# Patient Record
Sex: MALE | Race: WHITE
[De-identification: names, ages, dates, MRNs, and addresses within clinical notes are randomized per-mention and may not be internally consistent; named-entity substitution may affect disease eponyms.]

---

## 2019-01-04 ENCOUNTER — HOSPITAL ENCOUNTER (OUTPATIENT)
Dept: HOSPITAL 76 - LAB.N | Age: 54
Discharge: HOME | End: 2019-01-04
Attending: NURSE PRACTITIONER
Payer: MEDICAID

## 2019-01-04 DIAGNOSIS — R53.83: ICD-10-CM

## 2019-01-04 DIAGNOSIS — E55.9: ICD-10-CM

## 2019-01-04 DIAGNOSIS — I10: Primary | ICD-10-CM

## 2019-01-04 LAB
ALBUMIN DIAFP-MCNC: 4 G/DL (ref 3.2–5.5)
ALBUMIN/GLOB SERPL: 1.1 {RATIO} (ref 1–2.2)
ALP SERPL-CCNC: 77 IU/L (ref 42–121)
ALT SERPL W P-5'-P-CCNC: 67 IU/L (ref 10–60)
ANION GAP SERPL CALCULATED.4IONS-SCNC: 10 MMOL/L (ref 6–13)
AST SERPL W P-5'-P-CCNC: 37 IU/L (ref 10–42)
BASOPHILS NFR BLD AUTO: 0.1 10^3/UL (ref 0–0.1)
BASOPHILS NFR BLD AUTO: 1.6 %
BILIRUB BLD-MCNC: 0.6 MG/DL (ref 0.2–1)
BUN SERPL-MCNC: 14 MG/DL (ref 6–20)
CALCIUM UR-MCNC: 8.5 MG/DL (ref 8.5–10.3)
CHLORIDE SERPL-SCNC: 103 MMOL/L (ref 101–111)
CHOLEST SERPL-MCNC: 267 MG/DL
CO2 SERPL-SCNC: 24 MMOL/L (ref 21–32)
CREAT SERPLBLD-SCNC: 0.9 MG/DL (ref 0.6–1.2)
EOSINOPHIL # BLD AUTO: 0.3 10^3/UL (ref 0–0.7)
EOSINOPHIL NFR BLD AUTO: 3.3 %
ERYTHROCYTE [DISTWIDTH] IN BLOOD BY AUTOMATED COUNT: 14 % (ref 12–15)
FOLATE SERPL-MCNC: 11.07 NG/ML
GFRSERPLBLD MDRD-ARVRAT: 88 ML/MIN/{1.73_M2} (ref 89–?)
GLOBULIN SER-MCNC: 3.8 G/DL (ref 2.1–4.2)
GLUCOSE SERPL-MCNC: 208 MG/DL (ref 70–100)
HDLC SERPL-MCNC: 29 MG/DL
HDLC SERPL: 9.2 {RATIO} (ref ?–5)
HGB UR QL STRIP: 16.6 G/DL (ref 14–18)
LDLC SERPL-MCNC: 145 MG/DL
LDLC/HDLC SERPL: 5 {RATIO} (ref ?–3.6)
LYMPHOCYTES # SPEC AUTO: 2.5 10^3/UL (ref 1.5–3.5)
LYMPHOCYTES NFR BLD AUTO: 28 %
MCH RBC QN AUTO: 28.6 PG (ref 27–31)
MCHC RBC AUTO-ENTMCNC: 35 G/DL (ref 32–36)
MCV RBC AUTO: 81.6 FL (ref 80–94)
MONOCYTES # BLD AUTO: 0.7 10^3/UL (ref 0–1)
MONOCYTES NFR BLD AUTO: 8 %
NEUTROPHILS # BLD AUTO: 5.2 10^3/UL (ref 1.5–6.6)
NEUTROPHILS # SNV AUTO: 8.8 X10^3/UL (ref 4.8–10.8)
NEUTROPHILS NFR BLD AUTO: 59.1 %
PDW BLD AUTO: 8 FL (ref 7.4–11.4)
PLATELET # BLD: 290 10^3/UL (ref 130–450)
PROT SPEC-MCNC: 7.8 G/DL (ref 6.7–8.2)
RBC MAR: 5.82 10^6/UL (ref 4.7–6.1)
SODIUM SERPLBLD-SCNC: 137 MMOL/L (ref 135–145)
TSH SERPL-ACNC: 2.54 UIU/ML (ref 0.34–5.6)
VIT B12 SERPL-MCNC: 476 PG/ML (ref 180–914)

## 2019-01-04 PROCEDURE — 80061 LIPID PANEL: CPT

## 2019-01-04 PROCEDURE — 83721 ASSAY OF BLOOD LIPOPROTEIN: CPT

## 2019-01-04 PROCEDURE — 82746 ASSAY OF FOLIC ACID SERUM: CPT

## 2019-01-04 PROCEDURE — 82607 VITAMIN B-12: CPT

## 2019-01-04 PROCEDURE — 36415 COLL VENOUS BLD VENIPUNCTURE: CPT

## 2019-01-04 PROCEDURE — 80050 GENERAL HEALTH PANEL: CPT

## 2019-01-04 PROCEDURE — 82306 VITAMIN D 25 HYDROXY: CPT

## 2019-07-11 ENCOUNTER — HOSPITAL ENCOUNTER (EMERGENCY)
Dept: HOSPITAL 76 - ED | Age: 54
LOS: 1 days | Discharge: HOME | End: 2019-07-12
Payer: MEDICAID

## 2019-07-11 DIAGNOSIS — I10: ICD-10-CM
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
## 2019-07-11 DIAGNOSIS — S80.01XA: ICD-10-CM

## 2019-07-11 DIAGNOSIS — S50.812A: ICD-10-CM

## 2019-07-11 DIAGNOSIS — S80.211A: ICD-10-CM

## 2019-07-11 DIAGNOSIS — I25.10: ICD-10-CM

## 2019-07-11 DIAGNOSIS — S52.031A: Primary | ICD-10-CM
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
## 2019-07-11 DIAGNOSIS — S52.124A: ICD-10-CM

## 2019-07-11 PROCEDURE — 73080 X-RAY EXAM OF ELBOW: CPT

## 2019-07-11 PROCEDURE — 29105 APPLICATION LONG ARM SPLINT: CPT

## 2019-07-11 PROCEDURE — 80048 BASIC METABOLIC PNL TOTAL CA: CPT

## 2019-07-11 PROCEDURE — 99283 EMERGENCY DEPT VISIT LOW MDM: CPT

## 2019-07-11 PROCEDURE — 96374 THER/PROPH/DIAG INJ IV PUSH: CPT

## 2019-07-11 PROCEDURE — 85025 COMPLETE CBC W/AUTO DIFF WBC: CPT

## 2019-07-11 PROCEDURE — 96361 HYDRATE IV INFUSION ADD-ON: CPT

## 2019-07-11 PROCEDURE — 99284 EMERGENCY DEPT VISIT MOD MDM: CPT
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
## 2019-07-12 VITALS — DIASTOLIC BLOOD PRESSURE: 100 MMHG | SYSTOLIC BLOOD PRESSURE: 163 MMHG

## 2019-07-12 NOTE — XRAY REPORT
Reason:  pain and swelling from fall

Procedure Date:  07/11/2019   

Accession Number:  [id number] / [id number]                    

Procedure:  XR  - Elbow 3 View RT CPT Code:  

 

FULL RESULT:

 

 

EXAM:

RIGHT ELBOW RADIOGRAPHY

 

EXAM DATE: 7/11/2019 11:52 PM.

 

CLINICAL HISTORY: Pain and swelling from fall.

 

COMPARISON: None.

 

TECHNIQUE: 3 views.

 

FINDINGS:

Bones: There is an intra-articular fracture involving the olecranon 

process with up to 2.6 cm of displacement of the proximal fracture 

fragment. Additional nondisplaced fracture components are seen extending 

into the ulnar shaft. There is also a nondisplaced intra-articular radial 

head fracture.

 

Joints: No subluxation. Joint effusion present.

 

Soft Tissues: Soft tissue swelling posteriorly.

IMPRESSION:

1. Nondisplaced intra-articular radial head fracture.

2. Intra-articular olecranon process fracture with up to 2.6 cm of 

fracture fragment separation.

 

[facility]

## 2019-07-24 ENCOUNTER — HOSPITAL ENCOUNTER (OUTPATIENT)
Dept: HOSPITAL 76 - EMS | Age: 54
Discharge: ANOTHER HOSPITAL | End: 2019-07-24
Attending: SURGERY
Payer: MEDICAID

## 2019-07-24 DIAGNOSIS — R42: Primary | ICD-10-CM

## 2019-08-01 ENCOUNTER — HOSPITAL ENCOUNTER (OUTPATIENT)
Dept: HOSPITAL 76 - DI | Age: 54
Discharge: HOME | End: 2019-08-01
Attending: ORTHOPAEDIC SURGERY
Payer: MEDICAID

## 2019-08-01 DIAGNOSIS — S52.021A: ICD-10-CM

## 2019-08-01 DIAGNOSIS — S52.121A: Primary | ICD-10-CM

## 2019-08-02 NOTE — CT REPORT
Reason:  DISPLACED FRACTURE OF HEAD OF RIGHT RADIUS, DISPLA

Procedure Date:  08/01/2019   

Accession Number:  [id number] / [id number]                    

Procedure:  CT  - UPPER EXTREMITY WO - RT CPT Code:  

 

FULL RESULT:

 

 

EXAM:

RIGHT ELBOW CT WITHOUT CONTRAST

 

EXAM DATE: 8/1/2019 11:10 AM.

 

CLINICAL HISTORY: Displaced fracture of head of right radius.

 

COMPARISON: ELBOW 3 VIEW RT 07/11/2019 11:35 PM.

 

TECHNIQUE: Thin-section axial images were acquired of the elbow without 

contrast. Post-processing: Coronal and sagittal reformats. Other: None.

 

In accordance with CT protocol optimization, one or more of the following 

dose reduction techniques were utilized for this exam: automated exposure 

control, adjustment of mA and/or KV based on patient size, or use of 

iterative reconstructive technique.

 

FINDINGS:

Bones and articular surfaces: Comminuted intra-articular fracture of the 

radial head. A large segment of the central and posterior articular 

surface of the radial head is displaced posteriorly. 2 main fragments 

posterior to the capitellum appear to represent displaced articular 

surface fragments. The largest measures approximately 10 x 10 x 2.5 mm. 

The smaller fragment approximately 4.5 x 6.0 x 2 mm. Otherwise radial 

head appears normally aligned with the capitellum. Comminuted fracture of 

the proximal ulna. The proximal aspect of the olecranon process is 

fractured and displaced proximally approximately 2.7 cm. Additional 

nondisplaced oblique fracture courses distally from the olecranon 

fracture exiting the anterior cortex of the proximal ulna approximately 

3.3 cm distal to the tip of the coronoid process. Large elbow joint 

effusion.

 

Musculotendinous structures: Visualized musculotendinous structures 

appear grossly intact. No muscle atrophy or fatty replacement.

IMPRESSION:

1. Comminuted intra-articular radial head fracture with posterior 

displacement of large segments of the articular surface.

2. Comminuted fracture of the proximal ulna with proximal displacement of 

the olecranon fragment approximately 2.7 cm.

 

[facility]

## 2019-12-17 ENCOUNTER — HOSPITAL ENCOUNTER (OUTPATIENT)
Dept: HOSPITAL 76 - LAB.N | Age: 54
End: 2019-12-17
Attending: FAMILY MEDICINE
Payer: MEDICAID

## 2019-12-17 DIAGNOSIS — E11.9: Primary | ICD-10-CM

## 2019-12-17 LAB
ANION GAP SERPL CALCULATED.4IONS-SCNC: 11 MMOL/L (ref 6–13)
BUN SERPL-MCNC: 15 MG/DL (ref 6–20)
CALCIUM UR-MCNC: 8.8 MG/DL (ref 8.5–10.3)
CHLORIDE SERPL-SCNC: 99 MMOL/L (ref 101–111)
CO2 SERPL-SCNC: 27 MMOL/L (ref 21–32)
CREAT SERPLBLD-SCNC: 1.1 MG/DL (ref 0.6–1.2)
CREAT UR-SCNC: 331.2 MG/DL
EST. AVERAGE GLUCOSE BLD GHB EST-MCNC: 169 MG/DL (ref 70–100)
GFRSERPLBLD MDRD-ARVRAT: 69 ML/MIN/{1.73_M2} (ref 89–?)
GLUCOSE SERPL-MCNC: 217 MG/DL (ref 70–100)
HB2 TOTAL: 15.8 G/DL
HBA1C BLD-MCNC: 0.92 G/DL
HEMOGLOBIN A1C %: 7.5 % (ref 4.6–6.2)
MICROALBUMIN UR-MCNC: 53.3 MG/DL (ref 0–300)
MICROALBUMIN/CREAT RATIO PNL UR: 160.9 UG/MG (ref ?–30)
SODIUM SERPLBLD-SCNC: 137 MMOL/L (ref 135–145)

## 2019-12-17 PROCEDURE — 82570 ASSAY OF URINE CREATININE: CPT

## 2019-12-17 PROCEDURE — 83036 HEMOGLOBIN GLYCOSYLATED A1C: CPT

## 2019-12-17 PROCEDURE — 82043 UR ALBUMIN QUANTITATIVE: CPT

## 2019-12-17 PROCEDURE — 36415 COLL VENOUS BLD VENIPUNCTURE: CPT

## 2019-12-17 PROCEDURE — 80048 BASIC METABOLIC PNL TOTAL CA: CPT

## 2020-01-13 ENCOUNTER — HOSPITAL ENCOUNTER (OUTPATIENT)
Dept: HOSPITAL 76 - SC | Age: 55
Discharge: HOME | End: 2020-01-13
Attending: INTERNAL MEDICINE
Payer: MEDICAID

## 2020-01-13 VITALS — SYSTOLIC BLOOD PRESSURE: 160 MMHG | DIASTOLIC BLOOD PRESSURE: 100 MMHG

## 2020-01-13 DIAGNOSIS — E66.9: ICD-10-CM

## 2020-01-13 DIAGNOSIS — R53.83: ICD-10-CM

## 2020-01-13 DIAGNOSIS — I25.9: ICD-10-CM

## 2020-01-13 DIAGNOSIS — I10: ICD-10-CM

## 2020-01-13 DIAGNOSIS — R06.83: Primary | ICD-10-CM

## 2020-01-13 PROCEDURE — 99212 OFFICE O/P EST SF 10 MIN: CPT

## 2020-01-13 PROCEDURE — 99203 OFFICE O/P NEW LOW 30 MIN: CPT

## 2020-01-13 NOTE — SLEEP CARE CONSULTATION
Information from patient questionnaire entered by Winnie Massey.





I have reviewed and concur with the information entered by Winnie Massey. This 

document represents the service I personally performed and the decisions made by

me, Neena Rush MD, [facility].





History of Present Illness


Reason for Visit: New patient


Chief Complaint: reports: Fatigue, Other (checking to see if sleep is ok)


Duration of Symptoms: on/off for a year or so


Usual bedtime: 2300


Time it takes to fall asleep: 20-30 minutes


Snores at night: Yes (sometimes)


Observed to quit breathing while asleep: No


Sleeps alone due to snoring: No


Number of times waking at night: 1


Reasons for waking at night: reports: Bathroom


Toss, Turn, or Twitch while sleeping: No


Recalls having dreams: Yes


Usually gets out of bed at: 0700


Feels refreshed in the morning: Yes


Morning headache: No


Sleepy or fatigued during the day: No (once in a while)


Ever fallen asleep while driving: No


Takes day naps: Yes


Dreams during day naps: Yes


Prior sleep studies: No


Additional HPI information: 


I had the pleasure of seeing Mr. Martini today regarding the possibility of him 

having a sleep disorder.  As you know, he is a 54 year old gentleman who 

complains of fatigue for about a year.  The patient tells me that he normally 

goes to bed around 11 pm, and it takes him approximately 20 - 30 minutes to fall

asleep.  He snores occasionally.  He has never been observed to stop breathing 

in his sleep.  However, he sleeps alone.  He can recall waking up on the average

of 1 time during the night.  Most of the time he wakes up because of having to 

use the bathroom.  He has never awakened occasionally because of his own 

snoring, choking, or having to gasp for air.  There is not a lot of tossing and 

turning in his sleep.  No somniloquy (sleep talking) or somnambulism (sleep 

walking).  Generally he can recall having dreams.  In the morning he usually 

gets up out of the bed around 7 a.m. feeling refreshed and rested.  He usually 

does not have a morning headache.  During the day he does not feel sleepy.  His 

score on [place] Sleepiness Scale is 11 out of 24.  He has never fallen asleep 

while driving nor has had any accident due to sleepiness.  He usually takes naps

during the day.  Upon falling asleep during the day he reports having dreams.  

He has never had sleep paralysis, experienced cataplexy or symptoms of restless 

leg syndrome.  He reports having impaired concentration during the day.








Subjective


Initial [place] Sleepiness Scale score: 11





Past Medical History


Past Medical History: reports: Hypertension, Coronary Heart Disease (s/p CABG), 

Other (double bypass sept 2019)





Social History


The patient is not employed. Patient is Single and lives in [place]. 





Have you smoked in the past 12 months: No


Alcohol use: Yes


Alcohol amount and frequency: 1-2 Sat-Sun


Caffeine use: Yes


Caffeine amount and frequency: 1-2 cups/day





Family History


Family history of sleep disordered breathing: No





Allergies and Home Medications


Drug allergies reviewed: Yes


Home medication list reviewed: Yes


Allergy and home medication list: 


Meds: carvedilol, amlodipine, metformin, aspirin 


Allergies: penicillins








Review of Systems


Weight gain over past 5 years: 20


Weight loss over past 5 years: 20


Cardiovascular: reports: high blood pressure, have to sleep sitting up


Respiratory: reports: shortness of breath


Gastrointestinal: reports: heartburn, diarrhea


Urinary: denies: incontinence, frequency, urgency, impotence, other


Neurological: reports: headaches, head trauma, disorientation


Psychiatric: reports: Attention Deficit Hyperactivity, anxiety, depression, mood

disorder


Ear/Nose/Throat: reports: injury to nose, wisdom teeth removed


Endocrine: denies: thyroid disease, history of goiter, sluggishness, too hot or 

cold, excessive thirst, increased appetite, increased urination, unexplained 

weakness, other


Musculoskeletal: reports: joint pain, neck pain, back pain, muscle pain or 

cramping, mobility problems


Immunologic: reports: sneezing, rash, itching





Physical Exam


Vital signs obtained and entered by: Dr. Rush


Blood Pressure: 160/100


Cuff size: regular


Heart Rate: 73


O2 Saturation: 99


Height: 5 ft 7 in


Weight: 196 lb


Body Mass Index: 30.7


BMI Classification: Obesity Class 1


Neck circumference: 16.5


Mood/affect: normal


HEENT: No craniofacial malformation


Nostrils: patent to airflow


Turbinates: normal


Septum: deviated right


Mouth and throat: narrow oropharynx


Soft palate: long


Hard palate: normal


Uvula: normal


Uvula visualization: 50% Mallampati Class II


Tongue: normal in size


Tonsils: small


Chin and jaw: normal size and position


Neck: normal w/o lymphadenopathy or thyromegaly


Heart: regular rate and rhythm


Lungs: clear bilaterally


Abdomen: soft, non-tender


Extremities: no edema or clubbing


Neurologic: intact, no focal deficits





Impression and Plan


IMPRESSION: 


1. Obstructive Sleep Apnea-Hypopnea Syndrome, as suggested by history of loud 

snoring and fatigue.  Narrow oropharynx and obesity are common predisposing 

factors for obstructive sleep apnea-hypopnea syndrome.  The patient also has 

hypertension and ischemic heart disease, both of which can be worsened by 

untreated obstructive sleep apnea-hypopnea.  Pathophysiology of sleep-disordered

breathing was discussed.  I recommend proceeding to polysomnography to confirm 

the diagnosis and to assess severity.  If he has significant sleep disordered 

breathing, a manual CPAP titration study will also be performed to find the 

optimal treatment pressure.  I informed the patient of what the sleep studies 

involve and after some discussion, he agreed to proceed. 





Plan:  


1. Schedule polysomnography + manual CPAP titration study 


2. Avoid long distance driving or when feeling sleepy.


3. Avoid alcohol, sedative and muscle relaxant around bedtime.


4. Attempt to lose weight.


5. Return in 1 to 2 weeks after the study to discuss results and initiate 

therapy.





I spent 100% of this visit face to face with the patient with greater than 50% 

of this was spent time counseling the patient and coordination of care.

## 2020-01-22 ENCOUNTER — HOSPITAL ENCOUNTER (OUTPATIENT)
Dept: HOSPITAL 76 - SC | Age: 55
Discharge: HOME | End: 2020-01-22
Attending: INTERNAL MEDICINE
Payer: MEDICAID

## 2020-01-22 DIAGNOSIS — G47.61: ICD-10-CM

## 2020-01-22 DIAGNOSIS — G47.33: Primary | ICD-10-CM

## 2020-01-22 DIAGNOSIS — E66.9: ICD-10-CM

## 2020-01-22 PROCEDURE — 95810 POLYSOM 6/> YRS 4/> PARAM: CPT

## 2020-02-17 ENCOUNTER — HOSPITAL ENCOUNTER (OUTPATIENT)
Dept: HOSPITAL 76 - SC | Age: 55
Discharge: HOME | End: 2020-02-17
Attending: INTERNAL MEDICINE
Payer: MEDICAID

## 2020-02-17 DIAGNOSIS — G47.33: Primary | ICD-10-CM

## 2020-02-17 DIAGNOSIS — E66.9: ICD-10-CM

## 2020-02-17 PROCEDURE — 99213 OFFICE O/P EST LOW 20 MIN: CPT

## 2020-02-17 PROCEDURE — 99212 OFFICE O/P EST SF 10 MIN: CPT

## 2020-02-17 NOTE — SLEEP CARE CONSULTATION
Information from patient questionnaire entered by Winnie Massey.





I have reviewed and concur with the information entered by Winnie Massey. This 

document represents the service I personally performed and the decisions made by

me, Neena Rush MD, [facility].





History of Present Illness


Initial [place] Sleepiness Scale score: 11


Current [place] Sleepiness Scale score: 9


Additional HPI information: 


HPI:  Mr. Martini returned for follow up of the sleep study he had on 1/22/2020.

 The polysomnography showed that the patient had poor sleep efficiency due to 

two prolonged awakenings after the sleep onset.   The sleep architecture was 

abnormal for sleep fragmentation and reduced amount of time spent in slow wave 

sleep (N3).  Respiratory monitoring showed mild obstructive sleep apnea-hypopnea

(AHI = 6.2) associated with frequent arousals, oxyhemoglobin desaturation and 

mild hypoxia (harpreet oxygen saturation of 87%).   The patient did not sleep 

supine during this study (supine AHI = 0.0; non-supine = 6.22).   Snore was 

light in intensity.  There was moderate periodic leg movement of sleep not 

contributing to the sleep fragmentation.  Cardiac rhythm was normal sinus rhythm

without significant arrhythmia.  No abnormal behavior (parasomnia) observed 

during the night.





The patient was informed of these findings.  I explained to him the 

pathophysiology behind obstructive sleep apnea.  We then spent quite a bit of 

time discussing different treatment options.  For mild obstructive sleep apnea, 

surgery and oral appliance are alternatives to nasal CPAP therapy but in 

moderate or severe cases, nasal CPAP is the most effective and reliable 

treatment.  Weight loss in an obese individual is strongly recommended.  After 

some discussion, he opted to go with the nasal CPAP therapy.  I explained to him

how CPAP machine works and what to expect when using the machine.  He is 

encouraged to use CPAP every night especially in the first 2 to 3 nights in 

order to get used to it.  He should call his CPAP supplier or me to discuss any 

mechanical problem that may occur.  If he snores or feels like he is not getting

enough air from the machine, he should notify me and I will increase the 

pressure.








Allergies and Home Medications


Drug allergies reviewed: Yes


Home medication list reviewed: Yes





Review of Systems


Review of systems same as previous: Yes





Physical Exam


Height: 5 ft 7 in


Weight: 199 lb


Body Mass Index: 31.1


BMI Classification: Obese





Impression and Plan


IMPRESSION: 


1. Obstructive Sleep Apnea-Hypopnea Syndrome, mild, associated with mild 

hypoxemia and sleep fragmentation. Possibly, this is the cause of the patients 

symptoms of unrefreshed sleep, and excessive daytime sleepiness. As mentioned 

above, the patient will be started on an autoCPAP set at 5 - 15 cmH2O.  

Depending on his response and compliance he may be brought back for an overnight

CPAP titration study.





PLAN:     


1.  Prescription made for an autoCPAP, heated humidifier, and related supplies. 


2.  Attempt to lose weight and avoid alcohol consumption near bedtime.


3.  The patient is again cautioned about driving until his sleepiness completely

resolves on the CPAP therapy.





I spent 100% of this visit face to face with the patient with greater than 50% 

of this was spent time counseling the patient and coordination of care.

## 2020-06-23 ENCOUNTER — HOSPITAL ENCOUNTER (OUTPATIENT)
Dept: HOSPITAL 76 - LAB.WCP | Age: 55
Discharge: HOME | End: 2020-06-23
Attending: FAMILY MEDICINE
Payer: MEDICAID

## 2020-06-23 DIAGNOSIS — E78.2: ICD-10-CM

## 2020-06-23 DIAGNOSIS — E11.9: Primary | ICD-10-CM

## 2020-06-23 DIAGNOSIS — I10: ICD-10-CM

## 2020-06-23 LAB
ALBUMIN DIAFP-MCNC: 4.2 G/DL (ref 3.2–5.5)
ALBUMIN/GLOB SERPL: 1.4 {RATIO} (ref 1–2.2)
ALP SERPL-CCNC: 58 IU/L (ref 42–121)
ALT SERPL W P-5'-P-CCNC: 44 IU/L (ref 10–60)
ANION GAP SERPL CALCULATED.4IONS-SCNC: 9 MMOL/L (ref 6–13)
AST SERPL W P-5'-P-CCNC: 27 IU/L (ref 10–42)
BASOPHILS NFR BLD AUTO: 0.1 10^3/UL (ref 0–0.1)
BASOPHILS NFR BLD AUTO: 1.1 %
BILIRUB BLD-MCNC: 0.4 MG/DL (ref 0.2–1)
BUN SERPL-MCNC: 23 MG/DL (ref 6–20)
CALCIUM UR-MCNC: 9 MG/DL (ref 8.5–10.3)
CHLORIDE SERPL-SCNC: 101 MMOL/L (ref 101–111)
CHOLEST SERPL-MCNC: 238 MG/DL
CO2 SERPL-SCNC: 26 MMOL/L (ref 21–32)
CREAT SERPLBLD-SCNC: 1.3 MG/DL (ref 0.6–1.2)
CREAT UR-SCNC: 274.5 MG/DL
EOSINOPHIL # BLD AUTO: 0.3 10^3/UL (ref 0–0.7)
EOSINOPHIL NFR BLD AUTO: 2.4 %
ERYTHROCYTE [DISTWIDTH] IN BLOOD BY AUTOMATED COUNT: 13.8 % (ref 12–15)
EST. AVERAGE GLUCOSE BLD GHB EST-MCNC: 180 MG/DL (ref 70–100)
GLOBULIN SER-MCNC: 3 G/DL (ref 2.1–4.2)
GLUCOSE SERPL-MCNC: 184 MG/DL (ref 70–100)
HB2 TOTAL: 15.3 G/DL
HBA1C BLD-MCNC: 0.96 G/DL
HDLC SERPL-MCNC: 30 MG/DL
HDLC SERPL: 7.9 {RATIO} (ref ?–5)
HEMOGLOBIN A1C %: 7.9 % (ref 4.6–6.2)
HGB UR QL STRIP: 14.7 G/DL (ref 14–18)
LDLC SERPL-MCNC: 127 MG/DL
LDLC/HDLC SERPL: 4.2 {RATIO} (ref ?–3.6)
LYMPHOCYTES # SPEC AUTO: 2.8 10^3/UL (ref 1.5–3.5)
LYMPHOCYTES NFR BLD AUTO: 26.3 %
MCH RBC QN AUTO: 27.6 PG (ref 27–31)
MCHC RBC AUTO-ENTMCNC: 32.5 G/DL (ref 32–36)
MCV RBC AUTO: 84.8 FL (ref 80–94)
MICROALBUMIN UR-MCNC: 28.8 MG/DL (ref 0–300)
MICROALBUMIN/CREAT RATIO PNL UR: 104.9 UG/MG (ref ?–30)
MONOCYTES # BLD AUTO: 0.9 10^3/UL (ref 0–1)
MONOCYTES NFR BLD AUTO: 8.3 %
NEUTROPHILS # BLD AUTO: 6.5 10^3/UL (ref 1.5–6.6)
NEUTROPHILS # SNV AUTO: 10.6 X10^3/UL (ref 4.8–10.8)
NEUTROPHILS NFR BLD AUTO: 61.5 %
PDW BLD AUTO: 9.9 FL (ref 7.4–11.4)
PLATELET # BLD: 302 10^3/UL (ref 130–450)
PROT SPEC-MCNC: 7.2 G/DL (ref 6.7–8.2)
RBC MAR: 5.33 10^6/UL (ref 4.7–6.1)
SODIUM SERPLBLD-SCNC: 136 MMOL/L (ref 135–145)

## 2020-06-23 PROCEDURE — 82570 ASSAY OF URINE CREATININE: CPT

## 2020-06-23 PROCEDURE — 36415 COLL VENOUS BLD VENIPUNCTURE: CPT

## 2020-06-23 PROCEDURE — 83036 HEMOGLOBIN GLYCOSYLATED A1C: CPT

## 2020-06-23 PROCEDURE — 80061 LIPID PANEL: CPT

## 2020-06-23 PROCEDURE — 82043 UR ALBUMIN QUANTITATIVE: CPT

## 2020-06-23 PROCEDURE — 85025 COMPLETE CBC W/AUTO DIFF WBC: CPT

## 2020-06-23 PROCEDURE — 83721 ASSAY OF BLOOD LIPOPROTEIN: CPT

## 2020-06-23 PROCEDURE — 80053 COMPREHEN METABOLIC PANEL: CPT

## 2020-08-07 ENCOUNTER — HOSPITAL ENCOUNTER (OUTPATIENT)
Dept: HOSPITAL 76 - LAB.R | Age: 55
Discharge: HOME | End: 2020-08-07
Attending: FAMILY MEDICINE
Payer: MEDICAID

## 2020-08-07 DIAGNOSIS — R32: Primary | ICD-10-CM

## 2020-08-07 PROCEDURE — 87086 URINE CULTURE/COLONY COUNT: CPT

## 2021-01-15 ENCOUNTER — HOSPITAL ENCOUNTER (OUTPATIENT)
Dept: HOSPITAL 76 - LAB.N | Age: 56
Discharge: HOME | End: 2021-01-15
Attending: INTERNAL MEDICINE
Payer: MEDICAID

## 2021-01-15 DIAGNOSIS — Z80.42: ICD-10-CM

## 2021-01-15 DIAGNOSIS — R32: ICD-10-CM

## 2021-01-15 DIAGNOSIS — Z11.59: ICD-10-CM

## 2021-01-15 DIAGNOSIS — E11.9: Primary | ICD-10-CM

## 2021-01-15 LAB
ALBUMIN DIAFP-MCNC: 4 G/DL (ref 3.2–5.5)
ALBUMIN/GLOB SERPL: 1.1 {RATIO} (ref 1–2.2)
ALP SERPL-CCNC: 69 IU/L (ref 42–121)
ALT SERPL W P-5'-P-CCNC: 108 IU/L (ref 10–60)
ANION GAP SERPL CALCULATED.4IONS-SCNC: 12 MMOL/L (ref 6–13)
AST SERPL W P-5'-P-CCNC: 65 IU/L (ref 10–42)
BASOPHILS NFR BLD AUTO: 0.1 10^3/UL (ref 0–0.1)
BASOPHILS NFR BLD AUTO: 1.4 %
BILIRUB BLD-MCNC: 0.6 MG/DL (ref 0.2–1)
BUN SERPL-MCNC: 17 MG/DL (ref 6–20)
CALCIUM UR-MCNC: 9 MG/DL (ref 8.5–10.3)
CHLORIDE SERPL-SCNC: 99 MMOL/L (ref 101–111)
CHOLEST SERPL-MCNC: 292 MG/DL
CO2 SERPL-SCNC: 24 MMOL/L (ref 21–32)
CREAT SERPLBLD-SCNC: 1.2 MG/DL (ref 0.6–1.2)
CREAT UR-SCNC: 234.6 MG/DL
EOSINOPHIL # BLD AUTO: 0.3 10^3/UL (ref 0–0.7)
EOSINOPHIL NFR BLD AUTO: 3.3 %
ERYTHROCYTE [DISTWIDTH] IN BLOOD BY AUTOMATED COUNT: 13.5 % (ref 12–15)
GLOBULIN SER-MCNC: 3.5 G/DL (ref 2.1–4.2)
GLUCOSE SERPL-MCNC: 332 MG/DL (ref 70–100)
HBA1C MFR BLD HPLC: 9.9 % (ref 4.27–6.07)
HDLC SERPL-MCNC: 29 MG/DL
HDLC SERPL: 10.1 {RATIO} (ref ?–5)
HGB UR QL STRIP: 16.6 G/DL (ref 14–18)
LDLC SERPL-MCNC: 114 MG/DL
LDLC/HDLC SERPL: 3.9 {RATIO} (ref ?–3.6)
LYMPHOCYTES # SPEC AUTO: 2.6 10^3/UL (ref 1.5–3.5)
LYMPHOCYTES NFR BLD AUTO: 30.3 %
MCH RBC QN AUTO: 28.5 PG (ref 27–31)
MCHC RBC AUTO-ENTMCNC: 33.7 G/DL (ref 32–36)
MCV RBC AUTO: 84.4 FL (ref 80–94)
MICROALBUMIN UR-MCNC: 64.3 MG/DL (ref 0–300)
MICROALBUMIN/CREAT RATIO PNL UR: 274.1 UG/MG (ref ?–30)
MONOCYTES # BLD AUTO: 0.7 10^3/UL (ref 0–1)
MONOCYTES NFR BLD AUTO: 8 %
NEUTROPHILS # BLD AUTO: 4.8 10^3/UL (ref 1.5–6.6)
NEUTROPHILS # SNV AUTO: 8.5 X10^3/UL (ref 4.8–10.8)
NEUTROPHILS NFR BLD AUTO: 56.5 %
PDW BLD AUTO: 10.1 FL (ref 7.4–11.4)
PLATELET # BLD: 272 10^3/UL (ref 130–450)
PROT SPEC-MCNC: 7.5 G/DL (ref 6.7–8.2)
RBC MAR: 5.83 10^6/UL (ref 4.7–6.1)
SODIUM SERPLBLD-SCNC: 135 MMOL/L (ref 135–145)

## 2021-01-15 PROCEDURE — 80061 LIPID PANEL: CPT

## 2021-01-15 PROCEDURE — 83036 HEMOGLOBIN GLYCOSYLATED A1C: CPT

## 2021-01-15 PROCEDURE — 80053 COMPREHEN METABOLIC PANEL: CPT

## 2021-01-15 PROCEDURE — 86803 HEPATITIS C AB TEST: CPT

## 2021-01-15 PROCEDURE — 83721 ASSAY OF BLOOD LIPOPROTEIN: CPT

## 2021-01-15 PROCEDURE — 85025 COMPLETE CBC W/AUTO DIFF WBC: CPT

## 2021-01-15 PROCEDURE — 82274 ASSAY TEST FOR BLOOD FECAL: CPT

## 2021-01-15 PROCEDURE — 82043 UR ALBUMIN QUANTITATIVE: CPT

## 2021-01-15 PROCEDURE — 36415 COLL VENOUS BLD VENIPUNCTURE: CPT

## 2021-01-15 PROCEDURE — 84443 ASSAY THYROID STIM HORMONE: CPT

## 2021-01-15 PROCEDURE — 84153 ASSAY OF PSA TOTAL: CPT

## 2021-01-15 PROCEDURE — 87086 URINE CULTURE/COLONY COUNT: CPT

## 2021-01-15 PROCEDURE — 82570 ASSAY OF URINE CREATININE: CPT

## 2021-01-16 LAB
HEPATITIS C ANTIBODY: (no result)
SIGNAL TO CUT-OFF: 0 (ref ?–1)

## 2021-09-16 ENCOUNTER — HOSPITAL ENCOUNTER (OUTPATIENT)
Dept: HOSPITAL 76 - LAB.WCP | Age: 56
Discharge: HOME | End: 2021-09-16
Attending: INTERNAL MEDICINE
Payer: MEDICAID

## 2021-09-16 DIAGNOSIS — E78.2: ICD-10-CM

## 2021-09-16 DIAGNOSIS — E11.9: Primary | ICD-10-CM

## 2021-09-16 LAB
ALBUMIN DIAFP-MCNC: 4.3 G/DL (ref 3.2–5.5)
ALBUMIN/GLOB SERPL: 1.1 {RATIO} (ref 1–2.2)
ALP SERPL-CCNC: 79 IU/L (ref 42–121)
ALT SERPL W P-5'-P-CCNC: 67 IU/L (ref 10–60)
ANION GAP SERPL CALCULATED.4IONS-SCNC: 10 MMOL/L (ref 6–13)
AST SERPL W P-5'-P-CCNC: 38 IU/L (ref 10–42)
BASOPHILS NFR BLD AUTO: 0.1 10^3/UL (ref 0–0.1)
BASOPHILS NFR BLD AUTO: 1.1 %
BILIRUB BLD-MCNC: 0.9 MG/DL (ref 0.2–1)
BUN SERPL-MCNC: 18 MG/DL (ref 6–20)
CALCIUM UR-MCNC: 9 MG/DL (ref 8.5–10.3)
CHLORIDE SERPL-SCNC: 100 MMOL/L (ref 101–111)
CHOLEST SERPL-MCNC: 303 MG/DL
CO2 SERPL-SCNC: 26 MMOL/L (ref 21–32)
CREAT SERPLBLD-SCNC: 1 MG/DL (ref 0.6–1.2)
CREAT UR-SCNC: 128.8 MG/DL
EOSINOPHIL # BLD AUTO: 0.3 10^3/UL (ref 0–0.7)
EOSINOPHIL NFR BLD AUTO: 3.5 %
ERYTHROCYTE [DISTWIDTH] IN BLOOD BY AUTOMATED COUNT: 13.3 % (ref 12–15)
EST. AVERAGE GLUCOSE BLD GHB EST-MCNC: 263 MG/DL (ref 70–100)
GFRSERPLBLD MDRD-ARVRAT: 77 ML/MIN/{1.73_M2} (ref 89–?)
GLOBULIN SER-MCNC: 3.9 G/DL (ref 2.1–4.2)
GLUCOSE SERPL-MCNC: 276 MG/DL (ref 70–100)
HBA1C MFR BLD HPLC: 10.8 % (ref 4.27–6.07)
HCT VFR BLD AUTO: 51.9 % (ref 42–52)
HDLC SERPL-MCNC: 32 MG/DL
HDLC SERPL: 9.5 {RATIO} (ref ?–5)
HGB UR QL STRIP: 17.5 G/DL (ref 14–18)
LDLC SERPL-MCNC: 125 MG/DL
LDLC/HDLC SERPL: 3.9 {RATIO} (ref ?–3.6)
LYMPHOCYTES # SPEC AUTO: 2.5 10^3/UL (ref 1.5–3.5)
LYMPHOCYTES NFR BLD AUTO: 30.5 %
MCH RBC QN AUTO: 28.5 PG (ref 27–31)
MCHC RBC AUTO-ENTMCNC: 33.7 G/DL (ref 32–36)
MCV RBC AUTO: 84.7 FL (ref 80–94)
MICROALBUMIN UR-MCNC: 50.8 MG/DL (ref 0–300)
MICROALBUMIN/CREAT RATIO PNL UR: 394.4 UG/MG (ref ?–30)
MONOCYTES # BLD AUTO: 0.6 10^3/UL (ref 0–1)
MONOCYTES NFR BLD AUTO: 7.3 %
NEUTROPHILS # BLD AUTO: 4.6 10^3/UL (ref 1.5–6.6)
NEUTROPHILS # SNV AUTO: 8 X10^3/UL (ref 4.8–10.8)
NEUTROPHILS NFR BLD AUTO: 57.1 %
NRBC # BLD AUTO: 0 /100WBC
NRBC # BLD AUTO: 0 X10^3/UL
PDW BLD AUTO: 9.8 FL (ref 7.4–11.4)
PLATELET # BLD: 274 10^3/UL (ref 130–450)
POTASSIUM SERPL-SCNC: 3.8 MMOL/L (ref 3.5–5)
PROT SPEC-MCNC: 8.2 G/DL (ref 6.7–8.2)
RBC MAR: 6.13 10^6/UL (ref 4.7–6.1)
SODIUM SERPLBLD-SCNC: 136 MMOL/L (ref 135–145)
TRIGL P FAST SERPL-MCNC: 672 MG/DL
TSH SERPL-ACNC: 1.32 UIU/ML (ref 0.34–5.6)

## 2021-09-16 PROCEDURE — 82043 UR ALBUMIN QUANTITATIVE: CPT

## 2021-09-16 PROCEDURE — 84443 ASSAY THYROID STIM HORMONE: CPT

## 2021-09-16 PROCEDURE — 83721 ASSAY OF BLOOD LIPOPROTEIN: CPT

## 2021-09-16 PROCEDURE — 80053 COMPREHEN METABOLIC PANEL: CPT

## 2021-09-16 PROCEDURE — 36415 COLL VENOUS BLD VENIPUNCTURE: CPT

## 2021-09-16 PROCEDURE — 82570 ASSAY OF URINE CREATININE: CPT

## 2021-09-16 PROCEDURE — 85025 COMPLETE CBC W/AUTO DIFF WBC: CPT

## 2021-09-16 PROCEDURE — 80061 LIPID PANEL: CPT

## 2021-09-16 PROCEDURE — 83036 HEMOGLOBIN GLYCOSYLATED A1C: CPT

## 2022-02-16 ENCOUNTER — HOSPITAL ENCOUNTER (EMERGENCY)
Dept: HOSPITAL 76 - ED | Age: 57
Discharge: HOME | End: 2022-02-16
Payer: MEDICAID

## 2022-02-16 VITALS — DIASTOLIC BLOOD PRESSURE: 104 MMHG | SYSTOLIC BLOOD PRESSURE: 193 MMHG
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
## 2022-02-16 DIAGNOSIS — S30.0XXA: Primary | ICD-10-CM

## 2022-02-16 PROCEDURE — 72220 X-RAY EXAM SACRUM TAILBONE: CPT

## 2022-02-16 PROCEDURE — 99282 EMERGENCY DEPT VISIT SF MDM: CPT

## 2022-02-16 PROCEDURE — 99283 EMERGENCY DEPT VISIT LOW MDM: CPT

## 2022-02-16 NOTE — ED PHYSICIAN DOCUMENTATION
History of Present Illness





- Stated complaint


Stated Complaint: R ELBOW INJ





- Chief complaint


Chief Complaint: Trauma Ext





- History obtained from


History obtained from: Patient





- History of Present Illness


Timing: Today


Pain level max: 5


Pain level now: 3





- Additonal information


Additional information: 





56-year-old male presents to the emergency department stating that he fell on 

the bus today.  He states that most of the pain is in his tailbone.  Had some 

discomfort earlier to the right elbow but this is now resolved.  Also had 

discomfort to the right knee, which is also resolved.  Ambulating without 

difficulty and moving all joints without difficulty.  He states the only thing 

that really hurts is his tailbone.  No head, neck, back pain.  No loss of 

consciousness.  Worse with movement, better with rest.  Worse with sitting





Review of Systems


Constitutional: denies: Fever, Chills


Cardiac: denies: Chest pain / pressure, Palpitations


Respiratory: denies: Cough


GI: denies: Vomiting, Diarrhea


Skin: denies: Rash


Musculoskeletal: denies: Neck pain, Back pain





PD PAST MEDICAL HISTORY





- Past Medical History


Cardiovascular: Angina


Respiratory: None


Neuro: None


Endocrine/Autoimmune: None


GI: Diverticulitis


: None


HEENT: None


Psych: None


Musculoskeletal: Chronic back pain


Derm: None





- Past Surgical History


Past Surgical History: Yes


General: Appendectomy





- Present Medications


Home Medications: 


                                Ambulatory Orders











 Medication  Instructions  Recorded  Confirmed


 


Amitriptyline [Elavil] 10 mg PO DAILY 02/16/22 02/16/22


 


Aspirin [Liborio] 325 mg PO ONCE 02/16/22 02/16/22


 


Atorvastatin [Lipitor] 10 mg PO DAILY 02/16/22 02/16/22


 


Carvedilol [Coreg] 25 mg PO DAILY 02/16/22 02/16/22


 


clonazePAM [KlonoPIN] 0.5 mg PO ONCE 02/16/22 02/16/22


 


metFORMIN [Glucophage] 500 mg PO BIDAC 02/16/22 02/16/22














- Allergies


Allergies/Adverse Reactions: 


                                    Allergies











Allergy/AdvReac Type Severity Reaction Status Date / Time


 


Penicillins Allergy Severe seizure Verified 02/16/22 13:44














- Social History


Does the pt smoke?: No


Smoking Status: Never smoker


Does the pt drink ETOH?: Yes


Does the pt have substance abuse?: Yes





- Immunizations


Immunizations are current?: Yes





- POLST


Patient has POLST: No





PD ED PE NORMAL





- Vitals


Vital signs reviewed: Yes





- General


General: Alert and oriented X 3, No acute distress, Well developed/nourished





- HEENT


HEENT: Atraumatic, PERRL, Moist mucous membranes





- Neck


Neck: Supple, no meningeal sign, No bony TTP





- Cardiac


Cardiac: RRR, Strong equal pulses





- Respiratory


Respiratory: No respiratory distress, Clear bilaterally





- Abdomen


Abdomen: Soft, Non tender, Non distended





- Back


Back: No spinal TTP





- Derm


Derm: Warm and dry





- Extremities


Extremities: Other (No tenderness to palpation over the right knee.  No 

tenderness over the patella.  No swelling or bruising.  No tenderness over the 

right elbow.  Full range of motion without pain.  Does have tenderness over the 

sacrum and coccyx.  No bruising, swelling or deformity.)





- Neuro


Neuro: Alert and oriented X 3, CNs 2-12 intact, No motor deficit, No sensory 

deficit


Eye Opening: Spontaneous


Motor: Obeys Commands


Verbal: Oriented


GCS Score: 15





- Psych


Psych: Normal mood, Normal affect





Results





- Vitals


Vitals: 


                               Vital Signs - 24 hr











  02/16/22





  13:36


 


Temperature 36.3 C L


 


Heart Rate 102 H


 


Respiratory 18





Rate 


 


Blood Pressure 193/104 H


 


O2 Saturation 98








                                     Oxygen











O2 Source                      Room air

















- Rads (name of study)


  ** Sacrum/coccyx x-ray


Radiology: Final report received, EMP read contemporaneously, See rad report (No

 acute abnormality)





PD MEDICAL DECISION MAKING





- ED course


Complexity details: reviewed results, re-evaluated patient, considered 

differential, d/w patient


ED course: 





No acute findings on x-ray.  Full range of motion of the knee and elbow without 

any pain.  Pain well controlled here.  Ambulating without difficulty.  No 

indication for further work-up at this time.  GCS 15.  Atraumatic scalp exam.  

No neck or back pain.  Patient counseled regarding signs and symptoms for which 

I believe and urgent re-evaluation would be necessary. Patient with good 

understanding of and agreement to plan and is comfortable going home at this 

time





This document was made in part using voice recognition software. While efforts 

are made to proofread this document, sound alike and grammatical errors may 

occur.





Departure





- Departure


Disposition: 01 Home, Self Care


Clinical Impression: 


Sacral contusion


Qualifiers:


 Encounter type: initial encounter Qualified Code(s): S30.0XXA - Contusion of 

lower back and pelvis, initial encounter





Condition: Good


Instructions:  ED Contusion Sacrum Coccyx


Follow-Up: 


Arias Harris MD [Primary Care Provider] - Within 1 week


Comments: 


Your xray does not show any acute abnormalities today.  Follow-up with your 

doctor for further care.  You can use Motrin and/or Tylenol as needed for pain.


Discharge Date/Time: 02/16/22 17:07

## 2022-02-16 NOTE — XRAY REPORT
PROCEDURE:  Sacrum/Coccyx

 

INDICATIONS:  fall, coccyx pain

 

TECHNIQUE:  3 views of the sacrum and coccyx acquired.  

 

COMPARISON:  None.

 

 

FINDINGS:

BONES: Diffuse osteopenia. The sacroiliac joints are maintained.

No widening of the pubic symphysis. The sacral arcuate lines are maintained. No acute, displaced frac
ture or dislocation.

 

SOFT TISSUES: No focal abnormality.

 

IMPRESSION:  

1.No acute osseous abnormality.

 

   

 

 

Reviewed by: Sancho Karimi MD on 2/16/2022 4:29 PM PST

Approved by: Sancho Karimi MD on 2/16/2022 4:29 PM PST

 

 

Station ID:  [id number]

## 2022-10-13 ENCOUNTER — HOSPITAL ENCOUNTER (OUTPATIENT)
Dept: HOSPITAL 76 - DI.WOS | Age: 57
Discharge: HOME | End: 2022-10-13
Attending: PHYSICIAN ASSISTANT
Payer: MEDICAID

## 2022-10-13 DIAGNOSIS — M19.021: Primary | ICD-10-CM

## 2022-10-13 NOTE — XRAY REPORT
PROCEDURE:  Elbow 3 View RT

 

INDICATIONS:  RIGHT ELBOW PAIN

 

TECHNIQUE:  3 views of the elbow were acquired.  

 

COMPARISON:  X-ray elbow 7/11/2019

 

FINDINGS:  

Bones: There is an old olecranon fracture. There is an osseous fragment identified at the level of th
e humeral condyles. This appears likely related to migration of prior fragment. Degenerative changes 
are overall present at the elbow most notably at the radial head as well as lateral humeral condyle. 
No suspicious bony lesions.  

 

Soft tissues:  No elbow joint effusion.  No suspicious soft tissue calcifications.  

 

IMPRESSION:  

Sequela of old olecranon fracture.

 

Degenerative changes are present at the elbow including small fragmentation at the radius and lateral
 humeral condyle. While these are suspected to be degenerative, small areas of underlying subacute av
ulsion injury cannot be excluded and recommend correlation of point tenderness. As clinically indicat
ed, short interval x-ray or CT follow-up is recommended.

 

Reviewed by: Patti Rodríguez MD on 10/13/2022 3:39 PM PDT

Approved by: Patti Rodríguez MD on 10/13/2022 3:39 PM PDT

 

 

Station ID:  [id number]